# Patient Record
Sex: MALE | Race: ASIAN | URBAN - METROPOLITAN AREA
[De-identification: names, ages, dates, MRNs, and addresses within clinical notes are randomized per-mention and may not be internally consistent; named-entity substitution may affect disease eponyms.]

---

## 2018-11-06 ENCOUNTER — OFFICE VISIT (OUTPATIENT)
Dept: INTERNAL MEDICINE CLINIC | Age: 33
End: 2018-11-06
Payer: MEDICAID

## 2018-11-06 VITALS
HEIGHT: 63 IN | SYSTOLIC BLOOD PRESSURE: 132 MMHG | RESPIRATION RATE: 16 BRPM | DIASTOLIC BLOOD PRESSURE: 79 MMHG | HEART RATE: 90 BPM | WEIGHT: 148 LBS | BODY MASS INDEX: 26.22 KG/M2

## 2018-11-06 DIAGNOSIS — Z87.898 HISTORY OF PREDIABETES: ICD-10-CM

## 2018-11-06 DIAGNOSIS — H53.8 BLURRED VISION, BILATERAL: ICD-10-CM

## 2018-11-06 DIAGNOSIS — Z76.89 ESTABLISHING CARE WITH NEW DOCTOR, ENCOUNTER FOR: ICD-10-CM

## 2018-11-06 DIAGNOSIS — E53.8 VITAMIN B 12 DEFICIENCY: Primary | ICD-10-CM

## 2018-11-06 DIAGNOSIS — Z86.12 HISTORY OF POLIOMYELITIS: ICD-10-CM

## 2018-11-06 DIAGNOSIS — Z78.9 USES BRACE: ICD-10-CM

## 2018-11-06 DIAGNOSIS — Z13.220 SCREENING FOR HYPERLIPIDEMIA: ICD-10-CM

## 2018-11-06 PROCEDURE — G8419 CALC BMI OUT NRM PARAM NOF/U: HCPCS | Performed by: NURSE PRACTITIONER

## 2018-11-06 PROCEDURE — G8484 FLU IMMUNIZE NO ADMIN: HCPCS | Performed by: NURSE PRACTITIONER

## 2018-11-06 PROCEDURE — 99204 OFFICE O/P NEW MOD 45 MIN: CPT | Performed by: NURSE PRACTITIONER

## 2018-11-06 PROCEDURE — G8427 DOCREV CUR MEDS BY ELIG CLIN: HCPCS | Performed by: NURSE PRACTITIONER

## 2018-11-06 PROCEDURE — 1036F TOBACCO NON-USER: CPT | Performed by: NURSE PRACTITIONER

## 2018-11-06 ASSESSMENT — PATIENT HEALTH QUESTIONNAIRE - PHQ9
SUM OF ALL RESPONSES TO PHQ QUESTIONS 1-9: 0
SUM OF ALL RESPONSES TO PHQ9 QUESTIONS 1 & 2: 0
1. LITTLE INTEREST OR PLEASURE IN DOING THINGS: 0
SUM OF ALL RESPONSES TO PHQ QUESTIONS 1-9: 0
2. FEELING DOWN, DEPRESSED OR HOPELESS: 0

## 2018-11-08 DIAGNOSIS — Z13.220 SCREENING FOR HYPERLIPIDEMIA: ICD-10-CM

## 2018-11-08 DIAGNOSIS — E53.8 VITAMIN B 12 DEFICIENCY: ICD-10-CM

## 2018-11-08 DIAGNOSIS — Z87.898 HISTORY OF PREDIABETES: ICD-10-CM

## 2018-11-08 LAB
A/G RATIO: 2 (ref 1.1–2.2)
ALBUMIN SERPL-MCNC: 5.1 G/DL (ref 3.4–5)
ALP BLD-CCNC: 91 U/L (ref 40–129)
ALT SERPL-CCNC: 41 U/L (ref 10–40)
ANION GAP SERPL CALCULATED.3IONS-SCNC: 15 MMOL/L (ref 3–16)
AST SERPL-CCNC: 26 U/L (ref 15–37)
BILIRUB SERPL-MCNC: 1.2 MG/DL (ref 0–1)
BUN BLDV-MCNC: 8 MG/DL (ref 7–20)
CALCIUM SERPL-MCNC: 9.4 MG/DL (ref 8.3–10.6)
CHLORIDE BLD-SCNC: 101 MMOL/L (ref 99–110)
CHOLESTEROL, FASTING: 169 MG/DL (ref 0–199)
CO2: 23 MMOL/L (ref 21–32)
CREAT SERPL-MCNC: 0.7 MG/DL (ref 0.9–1.3)
FOLATE: 13.04 NG/ML (ref 4.78–24.2)
GFR AFRICAN AMERICAN: >60
GFR NON-AFRICAN AMERICAN: >60
GLOBULIN: 2.6 G/DL
GLUCOSE FASTING: 124 MG/DL (ref 70–99)
HDLC SERPL-MCNC: 36 MG/DL (ref 40–60)
LDL CHOLESTEROL CALCULATED: 82 MG/DL
POTASSIUM SERPL-SCNC: 4.1 MMOL/L (ref 3.5–5.1)
SODIUM BLD-SCNC: 139 MMOL/L (ref 136–145)
TOTAL PROTEIN: 7.7 G/DL (ref 6.4–8.2)
TRIGLYCERIDE, FASTING: 253 MG/DL (ref 0–150)
VITAMIN B-12: 245 PG/ML (ref 211–911)
VLDLC SERPL CALC-MCNC: 51 MG/DL

## 2018-11-09 LAB
ESTIMATED AVERAGE GLUCOSE: 134.1 MG/DL
HBA1C MFR BLD: 6.3 %

## 2018-11-13 ASSESSMENT — ENCOUNTER SYMPTOMS
NAUSEA: 0
COUGH: 0
WHEEZING: 0
SHORTNESS OF BREATH: 0
VOMITING: 0
CHEST TIGHTNESS: 0
SORE THROAT: 0
CONSTIPATION: 0
RHINORRHEA: 0
DIARRHEA: 0
COLOR CHANGE: 0
EYE DISCHARGE: 0
EYE REDNESS: 0

## 2019-05-15 ENCOUNTER — PATIENT MESSAGE (OUTPATIENT)
Dept: INTERNAL MEDICINE CLINIC | Age: 34
End: 2019-05-15

## 2019-05-17 NOTE — TELEPHONE ENCOUNTER
From: Shiva  To: MELANY Smith - CNP  Sent: 5/15/2019 6:58 PM EDT  Subject: Prescription Question    I have a question about VITAMIN B12 & FOLATE resulted on 11/8/18, 3:16 PM. Based on my lab work, how is my Vitamins B12 &FOLATE. Do you recommend me to take medicine or not?

## 2019-05-21 NOTE — TELEPHONE ENCOUNTER
Patient missed follow up appt that was scheduled in December, need to make appt as soon as possible to discuss lab results. Child  Still living? No  Family history of diabetes mellitus (DM), Age at diagnosis: Age Unknown

## 2019-05-22 ENCOUNTER — OFFICE VISIT (OUTPATIENT)
Dept: INTERNAL MEDICINE CLINIC | Age: 34
End: 2019-05-22
Payer: MEDICAID

## 2019-05-22 VITALS
OXYGEN SATURATION: 97 % | SYSTOLIC BLOOD PRESSURE: 149 MMHG | BODY MASS INDEX: 26.05 KG/M2 | WEIGHT: 147 LBS | DIASTOLIC BLOOD PRESSURE: 88 MMHG | HEART RATE: 76 BPM | TEMPERATURE: 98.6 F | RESPIRATION RATE: 17 BRPM | HEIGHT: 63 IN

## 2019-05-22 DIAGNOSIS — M21.951 DEFORMITY OF RIGHT LOWER EXTREMITY: ICD-10-CM

## 2019-05-22 DIAGNOSIS — H54.7 VISION PROBLEMS: ICD-10-CM

## 2019-05-22 DIAGNOSIS — E11.9 TYPE 2 DIABETES MELLITUS WITHOUT COMPLICATION, WITHOUT LONG-TERM CURRENT USE OF INSULIN (HCC): Primary | ICD-10-CM

## 2019-05-22 DIAGNOSIS — E78.1 HYPERTRIGLYCERIDEMIA: ICD-10-CM

## 2019-05-22 LAB — HBA1C MFR BLD: 6.7 %

## 2019-05-22 PROCEDURE — G8427 DOCREV CUR MEDS BY ELIG CLIN: HCPCS | Performed by: NURSE PRACTITIONER

## 2019-05-22 PROCEDURE — 83036 HEMOGLOBIN GLYCOSYLATED A1C: CPT | Performed by: NURSE PRACTITIONER

## 2019-05-22 PROCEDURE — G8419 CALC BMI OUT NRM PARAM NOF/U: HCPCS | Performed by: NURSE PRACTITIONER

## 2019-05-22 PROCEDURE — 3044F HG A1C LEVEL LT 7.0%: CPT | Performed by: NURSE PRACTITIONER

## 2019-05-22 PROCEDURE — 99214 OFFICE O/P EST MOD 30 MIN: CPT | Performed by: NURSE PRACTITIONER

## 2019-05-22 PROCEDURE — 2022F DILAT RTA XM EVC RTNOPTHY: CPT | Performed by: NURSE PRACTITIONER

## 2019-05-22 PROCEDURE — 1036F TOBACCO NON-USER: CPT | Performed by: NURSE PRACTITIONER

## 2019-05-22 RX ORDER — GEMFIBROZIL 600 MG/1
600 TABLET, FILM COATED ORAL
Qty: 60 TABLET | Refills: 2 | Status: SHIPPED | OUTPATIENT
Start: 2019-05-22 | End: 2019-07-03

## 2019-05-22 ASSESSMENT — ENCOUNTER SYMPTOMS
VOMITING: 0
COUGH: 0
SORE THROAT: 0
DIARRHEA: 0
NAUSEA: 0
BACK PAIN: 0
RHINORRHEA: 0

## 2019-05-22 ASSESSMENT — PATIENT HEALTH QUESTIONNAIRE - PHQ9
SUM OF ALL RESPONSES TO PHQ9 QUESTIONS 1 & 2: 0
SUM OF ALL RESPONSES TO PHQ QUESTIONS 1-9: 0
2. FEELING DOWN, DEPRESSED OR HOPELESS: 0
1. LITTLE INTEREST OR PLEASURE IN DOING THINGS: 0
SUM OF ALL RESPONSES TO PHQ QUESTIONS 1-9: 0

## 2019-05-22 NOTE — PATIENT INSTRUCTIONS
Patient Education        Prediabetes: Care Instructions  Your Care Instructions    Prediabetes is a warning sign that you are at risk for getting type 2 diabetes. It means that your blood sugar is higher than it should be. The food you eat turns into sugar, which your body uses for energy. Normally, an organ called the pancreas makes insulin, which allows the sugar in your blood to get into your body's cells. But when your body can't use insulin the right way, the sugar doesn't move into cells. It stays in your blood instead. This is called insulin resistance. The buildup of sugar in the blood causes prediabetes. The good news is that lifestyle changes may help you get your blood sugar back to normal and help you avoid or delay diabetes. Follow-up care is a key part of your treatment and safety. Be sure to make and go to all appointments, and call your doctor if you are having problems. It's also a good idea to know your test results and keep a list of the medicines you take. How can you care for yourself at home? · Watch your weight. A healthy weight helps your body use insulin properly. · Limit the amount of calories, sweets, and unhealthy fat you eat. Ask your doctor if you should see a dietitian. A registered dietitian can help you create meal plans that fit your lifestyle. · Get at least 30 minutes of exercise on most days of the week. Exercise helps control your blood sugar. It also helps you maintain a healthy weight. Walking is a good choice. You also may want to do other activities, such as running, swimming, cycling, or playing tennis or team sports. · Do not smoke. Smoking can make prediabetes worse. If you need help quitting, talk to your doctor about stop-smoking programs and medicines. These can increase your chances of quitting for good. · If your doctor prescribed medicines, take them exactly as prescribed. Call your doctor if you think you are having a problem with your medicine.  You will get more details on the specific medicines your doctor prescribes. When should you call for help? Watch closely for changes in your health, and be sure to contact your doctor if:    · You have any symptoms of diabetes. These may include:  ? Being thirsty more often. ? Urinating more. ? Being hungrier. ? Losing weight. ? Being very tired. ? Having blurry vision.     · You have a wound that will not heal.     · You have an infection that will not go away.     · You have problems with your blood pressure.     · You want more information about diabetes and how you can keep from getting it. Where can you learn more? Go to https://Heliaepepiceweb.Ask Ziggy. org and sign in to your StitcherAds account. Enter I222 in the Cloud Lending box to learn more about \"Prediabetes: Care Instructions. \"     If you do not have an account, please click on the \"Sign Up Now\" link. Current as of: July 25, 2018  Content Version: 12.0  © 0454-4618 Healthwise, [employer]. Care instructions adapted under license by [facility] ([facility]). If you have questions about a medical condition or this instruction, always ask your healthcare professional. [address] any warranty or liability for your use of this information. Patient Education        Learning About Diabetes Food Guidelines  Your Care Instructions    Meal planning is important to manage diabetes. It helps keep your blood sugar at a target level (which you set with your doctor). You don't have to eat special foods. You can eat what your family eats, including sweets once in a while. But you do have to pay attention to how often you eat and how much you eat of certain foods. You may want to work with a dietitian or a certified diabetes educator (CDE) to help you plan meals and snacks. A dietitian or CDE can also help you lose weight if that is one of your goals. What should you know about eating carbs?   Managing the amount of carbohydrate (carbs) you eat is an important part of healthy meals when you have diabetes. Carbohydrate is found in many foods. · Learn which foods have carbs. And learn the amounts of carbs in different foods. ? Bread, cereal, pasta, and rice have about 15 grams of carbs in a serving. A serving is 1 slice of bread (1 ounce), ½ cup of cooked cereal, or 1/3 cup of cooked pasta or rice. ? Fruits have 15 grams of carbs in a serving. A serving is 1 small fresh fruit, such as an apple or orange; ½ of a banana; ½ cup of cooked or canned fruit; ½ cup of fruit juice; 1 cup of melon or raspberries; or 2 tablespoons of dried fruit. ? Milk and no-sugar-added yogurt have 15 grams of carbs in a serving. A serving is 1 cup of milk or 2/3 cup of no-sugar-added yogurt. ? Starchy vegetables have 15 grams of carbs in a serving. A serving is ½ cup of mashed potatoes or sweet potato; 1 cup winter squash; ½ of a small baked potato; ½ cup of cooked beans; or ½ cup cooked corn or green peas. · Learn how much carbs to eat each day and at each meal. A dietitian or CDE can teach you how to keep track of the amount of carbs you eat. This is called carbohydrate counting. · If you are not sure how to count carbohydrate grams, use the Plate Method to plan meals. It is a good, quick way to make sure that you have a balanced meal. It also helps you spread carbs throughout the day. ? Divide your plate by types of foods. Put non-starchy vegetables on half the plate, meat or other protein food on one-quarter of the plate, and a grain or starchy vegetable in the final quarter of the plate. To this you can add a small piece of fruit and 1 cup of milk or yogurt, depending on how many carbs you are supposed to eat at a meal.  · Try to eat about the same amount of carbs at each meal. Do not \"save up\" your daily allowance of carbs to eat at one meal.  · Proteins have very little or no carbs per serving.  Examples of proteins are beef, chicken, turkey, fish, eggs, tofu, cheese, cottage cheese, and peanut butter. A serving size of meat is 3 ounces, which is about the size of a deck of cards. Examples of meat substitute serving sizes (equal to 1 ounce of meat) are 1/4 cup of cottage cheese, 1 egg, 1 tablespoon of peanut butter, and ½ cup of tofu. How can you eat out and still eat healthy? · Learn to estimate the serving sizes of foods that have carbohydrate. If you measure food at home, it will be easier to estimate the amount in a serving of restaurant food. · If the meal you order has too much carbohydrate (such as potatoes, corn, or baked beans), ask to have a low-carbohydrate food instead. Ask for a salad or green vegetables. · If you use insulin, check your blood sugar before and after eating out to help you plan how much to eat in the future. · If you eat more carbohydrate at a meal than you had planned, take a walk or do other exercise. This will help lower your blood sugar. What else should you know? · Limit saturated fat, such as the fat from meat and dairy products. This is a healthy choice because people who have diabetes are at higher risk of heart disease. So choose lean cuts of meat and nonfat or low-fat dairy products. Use olive or canola oil instead of butter or shortening when cooking. · Don't skip meals. Your blood sugar may drop too low if you skip meals and take insulin or certain medicines for diabetes. · Check with your doctor before you drink alcohol. Alcohol can cause your blood sugar to drop too low. Alcohol can also cause a bad reaction if you take certain diabetes medicines. Follow-up care is a key part of your treatment and safety. Be sure to make and go to all appointments, and call your doctor if you are having problems. It's also a good idea to know your test results and keep a list of the medicines you take. Where can you learn more? Go to https://kentrell.healthEncore.fm. org and sign in to your Codekko account.  Enter P630 in the Search Health Information box to learn more about \"Learning About Diabetes Food Guidelines. \"     If you do not have an account, please click on the \"Sign Up Now\" link. Current as of: July 25, 2018  Content Version: 12.0  © 6960-7415 [employer] Care instructions adapted under license by [facility] ([facility]). If you have questions about a medical condition or this instruction, always ask your healthcare professional. [address] any warranty or liability for your use of this information. Patient Education        Learning About High Cholesterol  What is high cholesterol? Cholesterol is a type of fat in your blood. It is needed for many body functions, such as making new cells. Cholesterol is made by your body. It also comes from food you eat. If you have too much cholesterol, it starts to build up in your arteries. This is called hardening of the arteries, or atherosclerosis. High cholesterol raises your risk of a heart attack and stroke. There are different types of cholesterol. LDL is the \"bad\" cholesterol. High LDL can raise your risk for heart disease, heart attack, and stroke. HDL is the \"good\" cholesterol. High HDL is linked with a lower risk for heart disease, heart attack, and stroke. Your cholesterol levels help your doctor find out your risk for having a heart attack or stroke. How can you prevent high cholesterol? A heart-healthy lifestyle can help you prevent high cholesterol. This lifestyle helps lower your risk for a heart attack and stroke. · Eat heart-healthy foods. ? Eat fruits, vegetables, whole grains (like oatmeal), dried beans and peas, nuts and seeds, soy products (like tofu), and fat-free or low-fat dairy products. ? Replace butter, margarine, and hydrogenated or partially hydrogenated oils with olive and canola oils. (Canola oil margarine without trans fat is fine.)  ? Replace red meat with fish, poultry, and soy protein (like tofu). ?  Limit processed and packaged foods like chips, crackers, and cookies. · Be active. Exercise can improve your cholesterol level. Get at least 30 minutes of exercise on most days of the week. Walking is a good choice. You also may want to do other activities, such as running, swimming, cycling, or playing tennis or team sports. · Stay at a healthy weight. Lose weight if you need to. · Don't smoke. If you need help quitting, talk to your doctor about stop-smoking programs and medicines. These can increase your chances of quitting for good. How is high cholesterol treated? The goal of treatment is to reduce your chances of having a heart attack or stroke. The goal is not to lower your cholesterol numbers only. · You may make lifestyle changes, such as eating healthy foods, not smoking, losing weight, and being more active. · You may have to take medicine. Follow-up care is a key part of your treatment and safety. Be sure to make and go to all appointments, and call your doctor if you are having problems. It's also a good idea to know your test results and keep a list of the medicines you take. Where can you learn more? Go to https://XimoXipepiceweb.Attune. org and sign in to your Gotta'go Personal Care Device account. Enter I232 in the formerly [facility] box to learn more about \"Learning About High Cholesterol. \"     If you do not have an account, please click on the \"Sign Up Now\" link. Current as of: July 22, 2018  Content Version: 12.0  © 3220-1843 Healthwise, Inc[employer]. Care instructions adapted under license by [facility] ([facility]). If you have questions about a medical condition or this instruction, always ask your healthcare professional. [place] [address] any warranty or liability for your use of this information.

## 2019-05-22 NOTE — PROGRESS NOTES
[facility]  [address] [place] [address] Drive  [address] [address] [address]  Dept: [phone]       2019     Davey Philippe (:  1985) is a 36 y.o. male, here for evaluation of the following medical concerns:    Patient here for follow up for prediabetes and hypertriglyceridemia. Patient also reports he also would like referral to get a new brace for his right leg, patient had polio as a child, has not seen an orthopedic provider in a few years. Review of Systems   Constitutional: Negative for activity change and appetite change. HENT: Negative for congestion, rhinorrhea, sneezing and sore throat. Respiratory: Negative for cough. Cardiovascular: Negative for chest pain. Gastrointestinal: Negative for diarrhea, nausea and vomiting. Genitourinary: Negative for dysuria. Musculoskeletal: Negative for back pain. Neurological: Negative for headaches. Prior to Visit Medications    Medication Sig Taking? Authorizing Provider   gemfibrozil (LOPID) 600 MG tablet Take 1 tablet by mouth 2 times daily (before meals) Yes Ila Ayala, APRN - CNP        Social History     Tobacco Use    Smoking status: Never Smoker    Smokeless tobacco: Never Used   Substance Use Topics    Alcohol use: No        Vitals:    19 1623   BP: (!) 149/88   Site: Left Upper Arm   Position: Sitting   Cuff Size: Small Adult   Pulse: 76   Resp: 17   Temp: 98.6 °F (37 °C)   TempSrc: Oral   SpO2: 97%   Weight: 147 lb (66.7 kg)   Height: 5' 3\" (1.6 m)     Estimated body mass index is 26.04 kg/m² as calculated from the following:    Height as of this encounter: 5' 3\" (1.6 m). Weight as of this encounter: 147 lb (66.7 kg). Physical Exam   Constitutional: He is oriented to person, place, and time. He appears well-developed. HENT:   Head: Normocephalic.    Right Ear: Tympanic membrane and ear canal normal.   Left Ear: Tympanic membrane and ear canal normal.   Mouth/Throat: Uvula is midline and oropharynx is clear and moist.   Eyes: Conjunctivae, EOM and lids are normal.   Neck: Normal range of motion. Neck supple. Cardiovascular: Regular rhythm and normal heart sounds. Pulmonary/Chest: Effort normal and breath sounds normal.   Abdominal: Soft. Normal appearance and bowel sounds are normal. There is no hepatosplenomegaly. Musculoskeletal:        Right hip: He exhibits decreased range of motion and decreased strength. Left hip: He exhibits decreased range of motion. FROM X4 EXT   Lymphadenopathy:     He has no cervical adenopathy. Neurological: He is alert and oriented to person, place, and time. Skin: Skin is warm and dry. Psychiatric: He has a normal mood and affect. ASSESSMENT/PLAN:    1. Type 2 diabetes mellitus without complication, without long-term current use of insulin ([address]  New diagnosis; patient refuses to to take medication at this time and wants to manage with diet and lifestyle changes  - POCT glycosylated hemoglobin (Hb A1C)  - [facility]) Medication Mgmt (Diabetes - Clinical Pharmacy) - [place]    2. Hypertriglyceridemia  Uncontrolled;  - gemfibrozil (LOPID) 600 MG tablet; Take 1 tablet by mouth 2 times daily (before meals)  Dispense: 60 tablet; Refill: 2    3. Deformity of right lower extremity    - Candace - Agusto Engle MD, Orthopedic Surgery, [facility]    4. Vision problems  New complaint;  - AFL - Ángeal Blake MD, Ophthalmology, [facility]      Return in about 3 months (around 8/21/2019) for f/u with Leandra Trent CNP extended visit, diabetes, cholesterol.

## 2019-05-29 ENCOUNTER — TELEPHONE (OUTPATIENT)
Dept: PHARMACY | Age: 34
End: 2019-05-29

## 2019-07-03 ENCOUNTER — OFFICE VISIT (OUTPATIENT)
Dept: PHARMACY | Age: 34
End: 2019-07-03
Payer: MEDICAID

## 2019-07-03 VITALS
DIASTOLIC BLOOD PRESSURE: 67 MMHG | BODY MASS INDEX: 25.72 KG/M2 | WEIGHT: 145.2 LBS | HEART RATE: 87 BPM | SYSTOLIC BLOOD PRESSURE: 160 MMHG

## 2019-07-03 DIAGNOSIS — E11.9 TYPE 2 DIABETES MELLITUS WITHOUT COMPLICATION, WITHOUT LONG-TERM CURRENT USE OF INSULIN (HCC): Primary | ICD-10-CM

## 2019-07-03 PROCEDURE — 99214 OFFICE O/P EST MOD 30 MIN: CPT

## 2019-07-03 RX ORDER — GEMFIBROZIL 600 MG/1
600 TABLET, FILM COATED ORAL
COMMUNITY

## 2019-07-03 NOTE — PATIENT INSTRUCTIONS
1. Think about keeping a log of your carbohydrates and decreasing these if needed  2. Making healthy food and beverage choices  3.  I will check on a physical activity program.   4. Call if you are interested in a dietitian visit or any follow up

## 2019-07-05 NOTE — PROGRESS NOTES
possibility of medication. PMHx:    Past Medical History:   Diagnosis Date    Prediabetes     Seizures ([address]     as a 2year old       HPI:   1. Type 2 diabetes mellitus without complication, without long-term current use of insulin ([facility])        Social History:    Social History     Tobacco Use    Smoking status: Never Smoker    Smokeless tobacco: Never Used   Substance Use Topics    Alcohol use: No     If current smoker, advised to quit: N/A    Pertinent Labs:    Lab Results   Component Value Date    LABA1C 6.7 05/22/2019    LABA1C 6.3 11/08/2018     We are providing patient education only and A1c not at goal, this was addressed with appropriate recommendations and f/u in 1-4 weeks or as appropriate:  No f/u at this time. He will call for f/u when he is able. Difficult schedule w full time work and school. Lab Results   Component Value Date    CREATININE 0.7 (L) 11/08/2018    BUN 8 11/08/2018     11/08/2018    K 4.1 11/08/2018     11/08/2018    CO2 23 11/08/2018     If creatinine elevated, medications affected were addressed: WNL    No results found for: [facility]  If not completed in the past year and not on an ACE Inhibitor, recommended lab: yes    Lab Results   Component Value Date    HDL 36 (L) 11/08/2018    LDLCALC 82 11/08/2018     Lab Results   Component Value Date    ALT 41 11/08/2018     Reviewed LDL and recommended therapy if appropriate: Yes. Mr. Genny Harden is on a Statin No: on Lopid, we talked about controlling w diet and appropriate levels. TG elevated     Weight:  Wt Readings from Last 3 Encounters:   07/03/19 145 lb 3.2 oz (65.9 kg)   05/22/19 147 lb (66.7 kg)   11/06/18 148 lb (67.1 kg)     If BMI elevated, weight loss recommended: yes, could benefit from a small weight loss. Immunizations: There is no immunization history on file for this patient.   If Influenza not current, recommended or given: yes  If Pneumococcal vaccinations not current, recommended or given: Eating plan Information  * Smoking Cessation Product Information    Physician Follow-up for Diabetes: yes       Plan     Action taken today including medication changes:  education    Recommendations to the physician:  [facility] [address] Follow-up   Diabetes Service   As needed     Electronically signed by Elzbieta Campos, PharmD on 7/5/2019 at 5:38 PM

## 2019-07-16 ENCOUNTER — TELEPHONE (OUTPATIENT)
Dept: INTERNAL MEDICINE CLINIC | Age: 34
End: 2019-07-16

## 2019-07-16 DIAGNOSIS — M21.951 DEFORMITY OF RIGHT LOWER EXTREMITY: Primary | ICD-10-CM

## 2019-07-16 NOTE — TELEPHONE ENCOUNTER
Tried to call the patient to explain that the brace would be ordered by Orthopedic surgery and not our office. The patient was referred to that office on 5/22/19 and never made an appointment.  Patient needs to call   [address] [place] [address] [address], [address], [address], [address]  Phone: [phone]

## 2019-08-01 ENCOUNTER — OFFICE VISIT (OUTPATIENT)
Dept: ORTHOPEDIC SURGERY | Age: 34
End: 2019-08-01
Payer: MEDICAID

## 2019-08-01 VITALS — WEIGHT: 145 LBS | HEIGHT: 63 IN | BODY MASS INDEX: 25.69 KG/M2 | RESPIRATION RATE: 16 BRPM

## 2019-08-01 DIAGNOSIS — M25.251 FLAIL JOINT, RIGHT HIP: Primary | ICD-10-CM

## 2019-08-01 DIAGNOSIS — M25.271 FLAIL JOINT, RIGHT ANKLE AND FOOT: ICD-10-CM

## 2019-08-01 DIAGNOSIS — M25.261 FLAIL JOINT, RIGHT KNEE: ICD-10-CM

## 2019-08-01 PROCEDURE — 99243 OFF/OP CNSLTJ NEW/EST LOW 30: CPT | Performed by: ORTHOPAEDIC SURGERY

## 2019-08-01 PROCEDURE — G8419 CALC BMI OUT NRM PARAM NOF/U: HCPCS | Performed by: ORTHOPAEDIC SURGERY

## 2019-08-01 PROCEDURE — G8427 DOCREV CUR MEDS BY ELIG CLIN: HCPCS | Performed by: ORTHOPAEDIC SURGERY

## 2019-08-01 NOTE — PROGRESS NOTES
Everton Jaeger  [id number]  August 1, 2019    Chief Complaint   Patient presents with    Leg Pain     rt leg        History: The patient is a 45-year-old gentleman who is here for evaluation of his right leg. The patient is from [place] and has an extensive history with regards to his right lower extremity. The patient reportedly describes having some neurologic issues and seizures at the age of 4. He then had some contractures involving his right lower extremity and ultimately required several surgeries. He had a surgery back in 2001 to straighten his leg. He then had a second surgery on his hip. He has been wearing an old knee ankle-foot orthosis for several years. He currently is going to college here in the [facility]. He is a Romansh . He denies any recent injury. This is a consult from MELANY Flores CNP  For right lower extremity weakness. The patient's  past medical history, medications, allergies,  family history, social history, and have been reviewed, and dated and are recorded in the chart. Pertinent items are noted in HPI. Review of systems reviewed from Pertinent History Form dated on 8/1/19 and available in the patient's chart under the Media tab. Vitals:  Resp 16   Ht 5' 3\" (1.6 m)   Wt 145 lb (65.8 kg)   BMI 25.69 kg/m²     Physical: On examination today, the patient is alert and oriented x3. Examination of the right lower extremity reveals diffuse atrophy. He has a well-healed anterolateral scar over his right hip. He essentially has a flail right lower extremity. He is unable to extend the right knee. We passively extended his knee without difficulty. He has 3 out of 5 strength for right knee flexion. He has 3+ out of 5 strength for right ankle dorsiflexion and plantarflexion. He has no pain with range of motion of his hips. Sensation does appear grossly intact in the right lower extremity. There is no evidence of DVT.   Examination of his left lower extremity

## 2019-09-11 ENCOUNTER — TELEPHONE (OUTPATIENT)
Dept: INTERNAL MEDICINE CLINIC | Age: 34
End: 2019-09-11

## 2019-09-16 ENCOUNTER — OFFICE VISIT (OUTPATIENT)
Dept: INTERNAL MEDICINE CLINIC | Age: 34
End: 2019-09-16
Payer: MEDICAID

## 2019-09-16 VITALS
HEART RATE: 79 BPM | OXYGEN SATURATION: 95 % | BODY MASS INDEX: 25.51 KG/M2 | SYSTOLIC BLOOD PRESSURE: 136 MMHG | DIASTOLIC BLOOD PRESSURE: 89 MMHG | WEIGHT: 144 LBS

## 2019-09-16 DIAGNOSIS — Z23 NEED FOR IMMUNIZATION AGAINST INFLUENZA: ICD-10-CM

## 2019-09-16 DIAGNOSIS — E78.1 HYPERTRIGLYCERIDEMIA: ICD-10-CM

## 2019-09-16 DIAGNOSIS — R73.03 PREDIABETES: Primary | ICD-10-CM

## 2019-09-16 DIAGNOSIS — Z86.12 HX OF POST-POLIO SYNDROME: ICD-10-CM

## 2019-09-16 LAB — HBA1C MFR BLD: 6 %

## 2019-09-16 PROCEDURE — 90686 IIV4 VACC NO PRSV 0.5 ML IM: CPT | Performed by: NURSE PRACTITIONER

## 2019-09-16 PROCEDURE — 99214 OFFICE O/P EST MOD 30 MIN: CPT | Performed by: NURSE PRACTITIONER

## 2019-09-16 PROCEDURE — G8419 CALC BMI OUT NRM PARAM NOF/U: HCPCS | Performed by: NURSE PRACTITIONER

## 2019-09-16 PROCEDURE — 90471 IMMUNIZATION ADMIN: CPT | Performed by: NURSE PRACTITIONER

## 2019-09-16 PROCEDURE — 83036 HEMOGLOBIN GLYCOSYLATED A1C: CPT | Performed by: NURSE PRACTITIONER

## 2019-09-16 PROCEDURE — G8427 DOCREV CUR MEDS BY ELIG CLIN: HCPCS | Performed by: NURSE PRACTITIONER

## 2019-09-16 PROCEDURE — 1036F TOBACCO NON-USER: CPT | Performed by: NURSE PRACTITIONER

## 2019-09-16 NOTE — PROGRESS NOTES
Left Upper Arm   Position: Sitting Sitting   Cuff Size: Large Adult Large Adult   Pulse: 69 79   SpO2: 95%    Weight: 144 lb (65.3 kg)      Estimated body mass index is 25.51 kg/m² as calculated from the following:    Height as of 8/1/19: 5' 3\" (1.6 m). Weight as of this encounter: 144 lb (65.3 kg). Physical Exam   Constitutional: He is oriented to person, place, and time. He appears well-developed. HENT:   Head: Normocephalic. Right Ear: Tympanic membrane and ear canal normal.   Left Ear: Tympanic membrane and ear canal normal.   Mouth/Throat: Uvula is midline and oropharynx is clear and moist.   Eyes: Lids are normal.   Cardiovascular: Regular rhythm and normal heart sounds. Pulmonary/Chest: Effort normal and breath sounds normal.   Abdominal: Soft. Normal appearance and bowel sounds are normal. There is no hepatosplenomegaly. Musculoskeletal:        Right upper leg: He exhibits deformity. Lymphadenopathy:     He has no cervical adenopathy. Neurological: He is alert and oriented to person, place, and time. ASSESSMENT/PLAN:    1. Prediabetes  Controlled; will continue to monitor  - POCT glycosylated hemoglobin (Hb A1C)  - Comprehensive Metabolic Panel, Fasting; Future    2. Hypertriglyceridemia  Uncontrolled; continue meds as prescribed  - Lipid, Fasting; Future    3. Hx of post-polio syndrome      4. Need for immunization against influenza    - INFLUENZA, QUADV, 3 YRS AND OLDER, IM PF, PREFILL SYR OR SDV, 0.5ML (AFLURIA QUADV, PF)      Return in about 6 months (around 3/16/2020) for follow up with Mars Hoskins CNP, prediabetes, cholesterol.

## 2019-09-17 ASSESSMENT — ENCOUNTER SYMPTOMS
DIARRHEA: 0
CONSTIPATION: 0
COUGH: 0
SORE THROAT: 0

## 2019-09-19 ENCOUNTER — TELEPHONE (OUTPATIENT)
Dept: INTERNAL MEDICINE CLINIC | Age: 34
End: 2019-09-19

## 2020-10-23 ENCOUNTER — TELEPHONE (OUTPATIENT)
Dept: ORTHOPEDIC SURGERY | Age: 35
End: 2020-10-23

## 2022-12-10 SDOH — HEALTH STABILITY: PHYSICAL HEALTH: ON AVERAGE, HOW MANY DAYS PER WEEK DO YOU ENGAGE IN MODERATE TO STRENUOUS EXERCISE (LIKE A BRISK WALK)?: 2 DAYS

## 2022-12-10 SDOH — HEALTH STABILITY: PHYSICAL HEALTH: ON AVERAGE, HOW MANY MINUTES DO YOU ENGAGE IN EXERCISE AT THIS LEVEL?: 10 MIN

## 2022-12-12 NOTE — PATIENT INSTRUCTIONS
Patient Education        Well Visit, Ages 25 to 72: Care Instructions  Well visits can help you stay healthy. Your doctor has checked your overall health and may have suggested ways to take good care of yourself. Your doctor also may have recommended tests. You can help prevent illness with healthy eating, good sleep, vaccinations, regular exercise, and other steps. Get the tests that you and your doctor decide on. Depending on your age and risks, examples might include screening for diabetes; hepatitis C; HIV; and cervical, breast, lung, and colon cancer. Screening helps find diseases before any symptoms appear. Eat healthy foods. Choose fruits, vegetables, whole grains, lean protein, and low-fat dairy foods. Limit saturated fat and reduce salt. Limit alcohol. Men should have no more than 2 drinks a day. Women should have no more than 1. For some people, no alcohol is the best choice. Exercise. Get at least 30 minutes of exercise on most days of the week. Walking can be a good choice. Reach and stay at your healthy weight. This will lower your risk for many health problems. Take care of your mental health. Try to stay connected with friends, family, and community, and find ways to manage stress. If you're feeling depressed or hopeless, talk to someone. A counselor can help. If you don't have a counselor, talk to your doctor. Talk to your doctor if you think you may have a problem with alcohol or drug use. This includes prescription medicines and illegal drugs. Avoid tobacco and nicotine: Don't smoke, vape, or chew. If you need help quitting, talk to your doctor. Practice safer sex. Getting tested, using condoms or dental dams, and limiting sex partners can help prevent STIs. Use birth control if it's important to you to prevent pregnancy. Talk with your doctor about your choices and what might be best for you. Prevent problems where you can.  Protect your skin from too much sun, wash your hands, brush your teeth twice a day, and wear a seat belt in the car. Where can you learn more? Go to http://www.lovett.com/ and enter P072 to learn more about \"Well Visit, Ages 25 to 72: Care Instructions. \"  Current as of: March 9, 2022               Content Version: 13.5  © 1596-4087 [employer], [employer]. Care instructions adapted under license by [facility] ([facility]). If you have questions about a medical condition or this instruction, always ask your healthcare professional. [address] any warranty or liability for your use of this information.

## 2022-12-12 NOTE — PROGRESS NOTES
2022    Doug [place] (:  1985) is a 40 y.o. male, here for evaluation of the following medical concerns:    HPI    Well Adult Physical: Patient here for a comprehensive physical exam.The patient reports problems - snoring, poor sleep  Do you take any herbs or supplements that were not prescribed by a doctor? no Are you taking calcium supplements? not applicable Are you taking aspirin daily? not applicable    Sexual activity: has sex with females   Diet: Healthy  Exercise: no regular exercise  Seatbelt use: yes    Review of Systems   Constitutional:  Negative for activity change, fatigue and unexpected weight change. HENT:  Negative for hearing loss. Eyes:  Negative for visual disturbance. Respiratory:  Negative for cough and shortness of breath. Cardiovascular:  Negative for chest pain and leg swelling. Gastrointestinal:  Negative for blood in stool. Endocrine: Negative for polydipsia and polyphagia. Genitourinary:  Negative for dysuria, frequency, penile discharge, penile pain, scrotal swelling and testicular pain. Musculoskeletal:  Negative for arthralgias. Skin:  Negative for rash. Allergic/Immunologic: Negative for environmental allergies. Neurological:  Negative for dizziness and headaches. Hematological:  Does not bruise/bleed easily. Psychiatric/Behavioral:  Negative for dysphoric mood and sleep disturbance. The patient is not nervous/anxious. Prior to Visit Medications    Medication Sig Taking? Authorizing Provider   Handicap Placard MISC by Does not apply route Expiration 5 Years.  Yes Maroln Dawn, DO        No Known Allergies    Past Medical History:   Diagnosis Date    Hx of post-polio syndrome     Prediabetes     Seizures ([facility] [address]     as a 2year old    Sleep apnea Not sure    I snores at night       Past Surgical History:   Procedure Laterality Date    APPENDECTOMY  2010    LEG SURGERY Right 1998       Social History     Socioeconomic History    Marital status:      Spouse name: Not on file    Number of children: Not on file    Years of education: Not on file    Highest education level: Not on file   Occupational History    Not on file   Tobacco Use    Smoking status: Never    Smokeless tobacco: Never   Vaping Use    Vaping Use: Never used   Substance and Sexual Activity    Alcohol use: No    Drug use: No    Sexual activity: Yes     Partners: Female   Other Topics Concern    Not on file   Social History Narrative    Not on file     Social Determinants of Health     Financial Resource Strain: Low Risk     Difficulty of Paying Living Expenses: Not hard at all   Food Insecurity: No Food Insecurity    Worried About Running Out of Food in the Last Year: Never true    [address] [facility] [address] of Food in the Last Year: Never true   Transportation Needs: Not on file   Physical Activity: Insufficiently Active    Days of Exercise per Week: 2 days    Minutes of Exercise per Session: 10 min   Stress: Not on file   Social Connections: Not on file   Intimate Partner Violence: Not At Risk    Fear of Current or Ex-Partner: No    Emotionally Abused: No    Physically Abused: No    Sexually Abused: No   Housing Stability: Not on file        Family History   Problem Relation Age of Onset    Diabetes Mother         She is dibetic    High Blood Pressure Father     Seizures Father     Kidney Disease Father     Diabetes Paternal Grandmother     Asthma Paternal Grandfather        Vitals:    12/13/22 0900 12/13/22 0909   BP: (!) 155/112 136/88   Pulse: 72    Temp: 98 °F (36.7 °C)    TempSrc: Temporal    Weight: 152 lb (68.9 kg)    Height: 5' 3\" (1.6 m)      Estimated body mass index is 26.93 kg/m² as calculated from the following:    Height as of this encounter: 5' 3\" (1.6 m). Weight as of this encounter: 152 lb (68.9 kg). Physical Exam  Vitals reviewed. Constitutional:       Appearance: Normal appearance. He is normal weight. HENT:      Head: Normocephalic and atraumatic.       Right Ear: Tympanic membrane, ear canal and external ear normal.      Left Ear: Tympanic membrane, ear canal and external ear normal.      Nose: Nose normal.      Mouth/Throat:      Mouth: Mucous membranes are moist.      Pharynx: Oropharynx is clear. Comments: Mallampati 4  Eyes:      Extraocular Movements: Extraocular movements intact. Conjunctiva/sclera: Conjunctivae normal.   Cardiovascular:      Rate and Rhythm: Normal rate and regular rhythm. Pulses: Normal pulses. Heart sounds: Normal heart sounds. Pulmonary:      Effort: Pulmonary effort is normal.      Breath sounds: Normal breath sounds. Abdominal:      General: Abdomen is flat. Bowel sounds are normal.      Palpations: Abdomen is soft. Musculoskeletal:         General: Normal range of motion. Cervical back: Normal range of motion and neck supple. Skin:     General: Skin is warm and dry. Capillary Refill: Capillary refill takes less than 2 seconds. Findings: No rash. Neurological:      General: No focal deficit present. Mental Status: He is alert and oriented to person, place, and time. Mental status is at baseline. Psychiatric:         Mood and Affect: Mood normal.         Behavior: Behavior normal.         Thought Content: Thought content normal.         Judgment: Judgment normal.       Separate Identifiable issues addressed today:  Sleep Apnea  Patient presents with possible obstructive sleep apnea. Patent has a several year history of symptoms of daytime fatigue. Patient generally gets 6 or 7 hours of sleep per night, and states they generally have nightime awakenings. Snoring of moderate severity is present. Apneic episodes is present. Nasal obstruction is not present. Patient has not had tonsillectomy. ASSESSMENT/PLAN:  Tony Marcus was seen today for [facility] care.     Diagnoses and all orders for this visit:    Routine general medical examination at a health care facility: Vitals reviewed and within normal limits. BMI nonobese. Depression screen negative. Reviewed lifestyle with patient and recommended appropriate modifications. Witnessed apneic spells: Snoring and witnessed episodes of apnea. Referring for sleep study. -     Linda Ayala MD, Sleep Medicine, [facility]    Paralysis of right lower extremity [facility]): Due to polio in his childhood. Has a brace, which was provided by Dr. Lorie Severino in the past.  Needs new brace. -     Karol Neff MD, Orthopedic Surgery (Hip; Knee; Shoulder; Elbow), [facility]    Screening for diabetes mellitus  -     Hemoglobin A1C; Future    Encounter for hepatitis C screening test for low risk patient  -     Hepatitis C Antibody; Future    Screening for HIV (human immunodeficiency virus)  -     HIV Screen; Future    Return in about 6 weeks (around 1/24/2023) for after sleep evaluation. An  electronic signature was used to authenticate this note.     --Juan Desai,  on 12/13/2022 at 9:36 AM

## 2022-12-13 ENCOUNTER — OFFICE VISIT (OUTPATIENT)
Dept: PRIMARY CARE CLINIC | Age: 37
End: 2022-12-13
Payer: MEDICAID

## 2022-12-13 VITALS
TEMPERATURE: 98 F | WEIGHT: 152 LBS | BODY MASS INDEX: 26.93 KG/M2 | HEIGHT: 63 IN | DIASTOLIC BLOOD PRESSURE: 88 MMHG | SYSTOLIC BLOOD PRESSURE: 136 MMHG | HEART RATE: 72 BPM

## 2022-12-13 DIAGNOSIS — G83.11 PARALYSIS OF RIGHT LOWER EXTREMITY (HCC): ICD-10-CM

## 2022-12-13 DIAGNOSIS — Z00.00 ROUTINE GENERAL MEDICAL EXAMINATION AT A HEALTH CARE FACILITY: ICD-10-CM

## 2022-12-13 DIAGNOSIS — Z13.1 SCREENING FOR DIABETES MELLITUS: ICD-10-CM

## 2022-12-13 DIAGNOSIS — Z00.00 ROUTINE GENERAL MEDICAL EXAMINATION AT A HEALTH CARE FACILITY: Primary | ICD-10-CM

## 2022-12-13 DIAGNOSIS — Z11.59 ENCOUNTER FOR HEPATITIS C SCREENING TEST FOR LOW RISK PATIENT: ICD-10-CM

## 2022-12-13 DIAGNOSIS — Z11.4 SCREENING FOR HIV (HUMAN IMMUNODEFICIENCY VIRUS): ICD-10-CM

## 2022-12-13 DIAGNOSIS — R06.81 WITNESSED APNEIC SPELLS: ICD-10-CM

## 2022-12-13 LAB
HEPATITIS C ANTIBODY INTERPRETATION: NORMAL
HIV AG/AB: NORMAL
HIV ANTIGEN: NORMAL
HIV-1 ANTIBODY: NORMAL
HIV-2 AB: NORMAL
VITAMIN B-12: 209 PG/ML (ref 211–911)
VITAMIN D 25-HYDROXY: 9.5 NG/ML

## 2022-12-13 PROCEDURE — G8482 FLU IMMUNIZE ORDER/ADMIN: HCPCS | Performed by: STUDENT IN AN ORGANIZED HEALTH CARE EDUCATION/TRAINING PROGRAM

## 2022-12-13 PROCEDURE — 99385 PREV VISIT NEW AGE 18-39: CPT | Performed by: STUDENT IN AN ORGANIZED HEALTH CARE EDUCATION/TRAINING PROGRAM

## 2022-12-13 RX ORDER — UBIDECARENONE 75 MG
100 CAPSULE ORAL DAILY
Qty: 30 TABLET | Refills: 1 | Status: SHIPPED | OUTPATIENT
Start: 2022-12-13 | End: 2023-12-13

## 2022-12-13 RX ORDER — ERGOCALCIFEROL 1.25 MG/1
50000 CAPSULE ORAL WEEKLY
Qty: 12 CAPSULE | Refills: 1 | Status: SHIPPED | OUTPATIENT
Start: 2022-12-13

## 2022-12-13 SDOH — ECONOMIC STABILITY: FOOD INSECURITY: WITHIN THE PAST 12 MONTHS, THE FOOD YOU BOUGHT JUST DIDN'T LAST AND YOU DIDN'T HAVE MONEY TO GET MORE.: NEVER TRUE

## 2022-12-13 SDOH — ECONOMIC STABILITY: FOOD INSECURITY: WITHIN THE PAST 12 MONTHS, YOU WORRIED THAT YOUR FOOD WOULD RUN OUT BEFORE YOU GOT MONEY TO BUY MORE.: NEVER TRUE

## 2022-12-13 ASSESSMENT — PATIENT HEALTH QUESTIONNAIRE - PHQ9
9. THOUGHTS THAT YOU WOULD BE BETTER OFF DEAD, OR OF HURTING YOURSELF: 0
7. TROUBLE CONCENTRATING ON THINGS, SUCH AS READING THE NEWSPAPER OR WATCHING TELEVISION: 0
2. FEELING DOWN, DEPRESSED OR HOPELESS: 0
SUM OF ALL RESPONSES TO PHQ QUESTIONS 1-9: 4
1. LITTLE INTEREST OR PLEASURE IN DOING THINGS: 0
4. FEELING TIRED OR HAVING LITTLE ENERGY: 2
SUM OF ALL RESPONSES TO PHQ9 QUESTIONS 1 & 2: 0
SUM OF ALL RESPONSES TO PHQ QUESTIONS 1-9: 4
3. TROUBLE FALLING OR STAYING ASLEEP: 2
SUM OF ALL RESPONSES TO PHQ QUESTIONS 1-9: 4
5. POOR APPETITE OR OVEREATING: 0
8. MOVING OR SPEAKING SO SLOWLY THAT OTHER PEOPLE COULD HAVE NOTICED. OR THE OPPOSITE, BEING SO FIGETY OR RESTLESS THAT YOU HAVE BEEN MOVING AROUND A LOT MORE THAN USUAL: 0
6. FEELING BAD ABOUT YOURSELF - OR THAT YOU ARE A FAILURE OR HAVE LET YOURSELF OR YOUR FAMILY DOWN: 0
SUM OF ALL RESPONSES TO PHQ QUESTIONS 1-9: 4
10. IF YOU CHECKED OFF ANY PROBLEMS, HOW DIFFICULT HAVE THESE PROBLEMS MADE IT FOR YOU TO DO YOUR WORK, TAKE CARE OF THINGS AT HOME, OR GET ALONG WITH OTHER PEOPLE: 1

## 2022-12-13 ASSESSMENT — ENCOUNTER SYMPTOMS
COUGH: 0
BLOOD IN STOOL: 0
SHORTNESS OF BREATH: 0

## 2022-12-13 ASSESSMENT — SOCIAL DETERMINANTS OF HEALTH (SDOH): HOW HARD IS IT FOR YOU TO PAY FOR THE VERY BASICS LIKE FOOD, HOUSING, MEDICAL CARE, AND HEATING?: NOT HARD AT ALL

## 2022-12-14 ENCOUNTER — TELEPHONE (OUTPATIENT)
Dept: ORTHOPEDIC SURGERY | Age: 37
End: 2022-12-14

## 2022-12-14 LAB
ESTIMATED AVERAGE GLUCOSE: 168.6 MG/DL
HBA1C MFR BLD: 7.5 %

## 2022-12-14 NOTE — TELEPHONE ENCOUNTER
General Question     Subject: BRACE  Patient an /or Facility Request: PATIENT NEEDS A PRESCRIPTION FOR A RIGHT LEG BRACE  Contact Number:  [phone]

## 2022-12-20 ENCOUNTER — OFFICE VISIT (OUTPATIENT)
Dept: ORTHOPEDIC SURGERY | Age: 37
End: 2022-12-20

## 2022-12-20 VITALS — BODY MASS INDEX: 26.58 KG/M2 | HEIGHT: 63 IN | WEIGHT: 150 LBS

## 2022-12-20 DIAGNOSIS — M25.20: Primary | ICD-10-CM

## 2022-12-20 NOTE — PROGRESS NOTES
Afsaneh Courser  [id number]  December 20, 2022    Chief Complaint   Patient presents with    Follow-up     Right lower extremity       History: The patient is a 61-year-old gentleman who is here for evaluation of his right lower extremity. The patient is from [place] and has an extensive history with regards to his right lower extremity. He does report a history of neurologic issues and severe seizures at the age of 4. He did not have access to medical care at that time and ultimately developed contractures in his right lower extremity. He then ultimately required several surgeries. He did have surgery in 2001 to realign his right lower extremity. He then had a second surgery on his hip. Since that time he has been wearing a knee ankle-foot orthosis. He is a Kis[place] . He denies any recent injury. He denies any pain at this time. Upon review of the chart, there is mention of possible post polio syndrome. The patient's  past medical history, medications, allergies,  family history, social history, and have been reviewed, and dated and are recorded in the chart. Pertinent items are noted in HPI. Review of systems reviewed from Pertinent History Form dated on 12/20 and available in the patient's chart under the Media tab. Vitals:  Ht 5' 3\" (1.6 m)   Wt 150 lb (68 kg)   BMI 26.57 kg/m²     Physical: On examination today, the patient is alert and oriented x3. Examination of the right lower extremity reveals diffuse atrophy. He has atrophy extending up into his buttocks. He has a well-healed anterolateral scar over his right hip. He essentially has a flail right lower extremity. He is unable to extend the right knee without assistance. We passively range the right knee without difficulty. The patient has 3/5 strength for right knee flexion. He has 3/5 strength for right ankle dorsiflexion and plantarflexion. He has no pain with range of motion of his hips.   Sensation does appear to be grossly intact in the right lower extremity. There is no evidence of DVT. Examination of the left lower extremity is unremarkable. X-rays: AP pelvis obtained in the office today was extensively reviewed. The femoral head is well located. The acetabulum has a somewhat horizontal inclination. The bony structures on the right are decreased in size compared to the left. Impression: Chronic right lower extremity weakness likely related to polio. Plan: At this time, the patient was given a new knee ankle-foot orthosis. He was encouraged to continue wearing the brace. We will refer the patient to [facility] neurology to more clearly define the neurologic issues. Orders Placed This Encounter   Procedures    XR PELVIS (1-2 VIEWS)     Rm 23.  AP Pelvis     Standing Status:   Future     Number of Occurrences:   1     Standing Expiration Date:   12/20/2023

## 2023-02-16 ENCOUNTER — OFFICE VISIT (OUTPATIENT)
Dept: SLEEP MEDICINE | Age: 38
End: 2023-02-16
Payer: MEDICAID

## 2023-02-16 VITALS
OXYGEN SATURATION: 100 % | DIASTOLIC BLOOD PRESSURE: 80 MMHG | HEIGHT: 62 IN | SYSTOLIC BLOOD PRESSURE: 130 MMHG | TEMPERATURE: 96.9 F | RESPIRATION RATE: 18 BRPM | BODY MASS INDEX: 27.53 KG/M2 | HEART RATE: 80 BPM | WEIGHT: 149.6 LBS

## 2023-02-16 DIAGNOSIS — R09.81 NASAL CONGESTION: ICD-10-CM

## 2023-02-16 DIAGNOSIS — G47.19 EXCESSIVE DAYTIME SLEEPINESS: ICD-10-CM

## 2023-02-16 DIAGNOSIS — G47.33 OSA (OBSTRUCTIVE SLEEP APNEA): Primary | ICD-10-CM

## 2023-02-16 DIAGNOSIS — J34.3 NASAL TURBINATE HYPERTROPHY: ICD-10-CM

## 2023-02-16 DIAGNOSIS — R06.83 SNORING: ICD-10-CM

## 2023-02-16 DIAGNOSIS — R06.89 GASPING FOR BREATH: ICD-10-CM

## 2023-02-16 DIAGNOSIS — R06.81 WITNESSED EPISODE OF APNEA: ICD-10-CM

## 2023-02-16 PROCEDURE — G8427 DOCREV CUR MEDS BY ELIG CLIN: HCPCS | Performed by: PSYCHIATRY & NEUROLOGY

## 2023-02-16 PROCEDURE — 1036F TOBACCO NON-USER: CPT | Performed by: PSYCHIATRY & NEUROLOGY

## 2023-02-16 PROCEDURE — G8482 FLU IMMUNIZE ORDER/ADMIN: HCPCS | Performed by: PSYCHIATRY & NEUROLOGY

## 2023-02-16 PROCEDURE — G8419 CALC BMI OUT NRM PARAM NOF/U: HCPCS | Performed by: PSYCHIATRY & NEUROLOGY

## 2023-02-16 PROCEDURE — 99204 OFFICE O/P NEW MOD 45 MIN: CPT | Performed by: PSYCHIATRY & NEUROLOGY

## 2023-02-16 RX ORDER — FLUTICASONE PROPIONATE 50 MCG
2 SPRAY, SUSPENSION (ML) NASAL DAILY
Qty: 16 G | Refills: 5 | Status: SHIPPED | OUTPATIENT
Start: 2023-02-16

## 2023-02-16 ASSESSMENT — ENCOUNTER SYMPTOMS
APNEA: 1
GASTROINTESTINAL NEGATIVE: 1
CHOKING: 1
EYES NEGATIVE: 1

## 2023-02-16 ASSESSMENT — SLEEP AND FATIGUE QUESTIONNAIRES
HOW LIKELY ARE YOU TO NOD OFF OR FALL ASLEEP WHILE SITTING INACTIVE IN A PUBLIC PLACE: 0
NECK CIRCUMFERENCE (INCHES): 16
HOW LIKELY ARE YOU TO NOD OFF OR FALL ASLEEP IN A CAR, WHILE STOPPED FOR A FEW MINUTES IN TRAFFIC: 0
HOW LIKELY ARE YOU TO NOD OFF OR FALL ASLEEP WHEN YOU ARE A PASSENGER IN A CAR FOR AN HOUR WITHOUT A BREAK: 2
HOW LIKELY ARE YOU TO NOD OFF OR FALL ASLEEP WHILE WATCHING TV: 1
HOW LIKELY ARE YOU TO NOD OFF OR FALL ASLEEP WHILE LYING DOWN TO REST IN THE AFTERNOON WHEN CIRCUMSTANCES PERMIT: 2
HOW LIKELY ARE YOU TO NOD OFF OR FALL ASLEEP WHILE SITTING AND TALKING TO SOMEONE: 0
HOW LIKELY ARE YOU TO NOD OFF OR FALL ASLEEP WHILE SITTING QUIETLY AFTER LUNCH WITHOUT ALCOHOL: 1
ESS TOTAL SCORE: 7
HOW LIKELY ARE YOU TO NOD OFF OR FALL ASLEEP WHILE SITTING AND READING: 1

## 2023-02-16 NOTE — PATIENT INSTRUCTIONS
Your feedback is important. If you receive a survey asking about your care experience, please respond. Your answers will help ensure you receive high-quality care at this office. Questions or concerns about your visit? Please call our office and speak to our manager.   Thank you!!        Orders Placed This Encounter   Procedures    Ambulatory referral to ENT     Referral Priority:   Routine     Referral Type:   Eval and Treat     Referral Reason:   Specialty Services Required     Number of Visits Requested:   1    Sleep Study with PAP Titration     Standing Status:   Future     Standing Expiration Date:   2/16/2024     Order Specific Question:   Sleep Study Titration Type     Answer:   Split Night Study (Baseline followed by PAP Titration)     Order Specific Question:   Location For Sleep Study     Answer:   [place]     Order Specific Question:   Select Sleep Lab Location     Answer:   [facility]

## 2023-02-16 NOTE — PROGRESS NOTES
MD KO Ferreira Board Certified in Sleep Medicine  Certified [facility] Sleep Medicine  Board Certified in Neurology [address]  [address] [facility] [address], [place] [address] ([address]  [address], [address]           [address]  [facility] SLEEP MEDICINE [address] [address] [phone]    Subjective:     Patient ID: Irving Hernandez is a 41 y.o. male. Chief Complaint   Patient presents with    Snoring    Establish Care         HPI:        Irving Hernandez is a 41 y.o. male referred by Dr. Susy Pedroza for a sleep evaluation. He complains of snoring, snorting, choking, periods of not breathing, tossing and turning, excessive daytime sleepiness, feels sleepy during the day, take naps during the day but he denies knees buckling with laughing, completely or partially paralyzed while falling asleep or waking up, noisy environment, uncomfortable room temperature, uncomfortable bedding. Symptoms began several months ago, gradually worsening since that time. The patient's bed-partner confirmed the snoring and stopped breathing at night. SLEEP SCHEDULE: Goes to bed around 11:30 PM in the weekdays and 11:30 PM in the weekends. It usually takes the patient 10 minutes to fall asleep. The patient gets up 1-2 per night to go to the bathroom. The Patient finally gets up at 6:30 AM during the weekdays and 8:30 AM in the weekends. patient wakes up with dry mouth and sometimes morning headache. . the headache usually dull headache. The patient has restless sleep with frequent arousals in addition to the Patient has significant daytime sleepiness. The Patient scored [place] Sleepiness Score: 7 on [place] Sleepiness Scale ( more than 10 is indicative of daytime sleepiness)and 31 in fatigue scale ( more than 36 is indicative of daytime fatigue).  The patient takes 2 naps/week for 25 minutes and usually is not refreshing nap. Previous evaluation and treatment has included- none.    has gained 10 in the last 5 years,      DOT/CDL - N/A  PETRA/Shonda - N/A      Previous Report(s) Reviewed: historical medical records       Social History     Socioeconomic History    Marital status:      Spouse name: Not on file    Number of children: Not on file    Years of education: Not on file    Highest education level: Not on file   Occupational History    Not on file   Tobacco Use    Smoking status: Former     Packs/day: 0.25     Years: 0.50     Pack years: 0.13     Types: Cigarettes     Quit date: 2013     Years since quitting: 10.1     Passive exposure: Past    Smokeless tobacco: Never   Vaping Use    Vaping Use: Never used   Substance and Sexual Activity    Alcohol use: No    Drug use: No    Sexual activity: Yes     Partners: Female   Other Topics Concern    Not on file   Social History Narrative    Not on file     Social Determinants of Health     Financial Resource Strain: Low Risk     Difficulty of Paying Living Expenses: Not hard at all   Food Insecurity: No Food Insecurity    Worried About Running Out of Food in the Last Year: Never true    Ran Out of Food in the Last Year: Never true   Transportation Needs: Not on file   Physical Activity: Insufficiently Active    Days of Exercise per Week: 2 days    Minutes of Exercise per Session: 10 min   Stress: Not on file   Social Connections: Not on file   Intimate Partner Violence: Not At Risk    Fear of Current or Ex-Partner: No    Emotionally Abused: No    Physically Abused: No    Sexually Abused: No   Housing Stability: Not on file       Prior to Admission medications    Medication Sig Start Date End Date Taking?  Authorizing Provider   fluticasone (FLONASE) 50 MCG/ACT nasal spray 2 sprays by Each Nostril route daily 2/16/23  Yes MD Derek Morris Mai MISC by Does not apply route Expiration 5 Years. 12/13/22  Yes Parish Keene, DO   vitamin D (ERGOCALCIFEROL) 1.25 MG (24493 UT) CAPS capsule Take 1 capsule by mouth once a week 12/13/22  Yes Parish Keene, DO   vitamin B-12 (CYANOCOBALAMIN) 100 MCG tablet Take 1 tablet by mouth daily 12/13/22 12/13/23 Yes Parish Keene, DO       Allergies as of 02/16/2023    (No Known Allergies)       Patient Active Problem List   Diagnosis    Hx of post-polio syndrome    Paralysis of right lower extremity (HCC)       Past Medical History:   Diagnosis Date    Hx of post-polio syndrome     Prediabetes     Seizures ([address]     as a 2year old       Past Surgical History:   Procedure Laterality Date    APPENDECTOMY  2010    LEG SURGERY Right 1998       Family History   Problem Relation Age of Onset    Diabetes Mother         She is dibetic    High Blood Pressure Father     Seizures Father     Kidney Disease Father     Diabetes Paternal Grandmother     Asthma Paternal Grandfather        Review of Systems   Constitutional: Negative. HENT: Negative. Eyes: Negative. Respiratory:  Positive for apnea and choking. Cardiovascular: Negative. Negative for leg swelling. Gastrointestinal: Negative. Endocrine: Negative. Genitourinary:  Positive for frequency. Musculoskeletal: Negative. Skin: Negative. Neurological:  Positive for headaches. Hematological: Negative. Psychiatric/Behavioral:  The patient is nervous/anxious. Objective:     Vitals:  Weight BMI Neck circumference    Wt Readings from Last 3 Encounters:   02/16/23 149 lb 9.6 oz (67.9 kg)   12/20/22 150 lb (68 kg)   12/13/22 152 lb (68.9 kg)    Body mass index is 27.36 kg/m².  Neck circumference (Inches): 16     BP HR SaO2   BP Readings from Last 3 Encounters:   02/16/23 130/80   12/13/22 136/88   09/16/19 136/89    Pulse Readings from Last 3 Encounters:   02/16/23 80   12/13/22 72   09/16/19 79    SpO2 Readings from Last 3 Encounters:   02/16/23 100%   09/16/19 95%   05/22/19 97%        The mandibular molar Class :   [x]1 []2 []3      Mallampati I Airway Classification:   []1 []2 [x]3 []4        Physical Exam  Vitals and nursing note reviewed. Constitutional:       Appearance: Normal appearance. HENT:      Nose: Congestion present. Comments: Turbinate hypertrophy       Mouth/Throat:      Comments: Mallampati class 3, no retrognathia or hypognathia , normal airflow in bilateral nostrils, no septum deviation , crowded oropharynx with low soft palate, high arched hard palate,no tonsils enlargement. Eyes:      Extraocular Movements: Extraocular movements intact. Cardiovascular:      Rate and Rhythm: Normal rate and regular rhythm. Pulmonary:      Effort: Pulmonary effort is normal.      Breath sounds: Normal breath sounds. Musculoskeletal:         General: Deformity (both legs) present. Cervical back: Neck supple. Skin:     General: Skin is warm. Neurological:      Mental Status: Mental status is at baseline. Psychiatric:         Mood and Affect: Mood normal.       Assessment:    Obstructive sleep apnea especially with snoring, snorting,  observed apnea, daytime sleepiness, Mallampati class of 3 and obesity. Diagnosis Orders   1. EVELINA (obstructive sleep apnea)  Sleep Study with PAP Titration      2. Snoring        3. Witnessed episode of apnea        4. Gasping for breath        5. Excessive daytime sleepiness        6. Nasal congestion  Ambulatory referral to ENT    fluticasone (FLONASE) 50 MCG/ACT nasal spray      7. Nasal turbinate hypertrophy  Ambulatory referral to ENT    fluticasone (FLONASE) 50 MCG/ACT nasal spray        Plan:     Patient was counseled about the pathophysiology of obstructive sleep apnea syndrome and the methods for evaluating its presence and severity. Patient was counseled to avoid driving and other potentially hazardous circumstances if the patient is experiencing excessive sleepiness.   Treatment considerations include the use of nasal CPAP, oral dental appliance or a surgical intervention, which should be based on otolarygologic findings, In the meantime, the patient should be cautioned to avoid the use of alcohol or other depressant medications because of potential for increasing the duration and severity of apnea and cautioned regarding driving or operating and dangerous equipment if the patient is experiencing daytime sleepiness. .       Orders Placed This Encounter   Procedures    Ambulatory referral to ENT    Sleep Study with PAP Titration         Return for F/U between 31 and 90 days from the recieving CPAP.     Karl Sprague MD  Medical Director - [facility]

## 2023-02-23 ENCOUNTER — OFFICE VISIT (OUTPATIENT)
Dept: ENT CLINIC | Age: 38
End: 2023-02-23
Payer: MEDICAID

## 2023-02-23 VITALS
HEIGHT: 62 IN | WEIGHT: 149 LBS | BODY MASS INDEX: 27.42 KG/M2 | DIASTOLIC BLOOD PRESSURE: 102 MMHG | HEART RATE: 70 BPM | SYSTOLIC BLOOD PRESSURE: 185 MMHG | OXYGEN SATURATION: 98 %

## 2023-02-23 DIAGNOSIS — R06.83 SNORING: ICD-10-CM

## 2023-02-23 DIAGNOSIS — J34.2 DEVIATED NASAL SEPTUM: ICD-10-CM

## 2023-02-23 DIAGNOSIS — J34.89 NASAL OBSTRUCTION: ICD-10-CM

## 2023-02-23 DIAGNOSIS — R09.81 NASAL CONGESTION: Primary | ICD-10-CM

## 2023-02-23 DIAGNOSIS — J34.3 HYPERTROPHY OF BOTH INFERIOR NASAL TURBINATES: ICD-10-CM

## 2023-02-23 PROCEDURE — G8419 CALC BMI OUT NRM PARAM NOF/U: HCPCS | Performed by: STUDENT IN AN ORGANIZED HEALTH CARE EDUCATION/TRAINING PROGRAM

## 2023-02-23 PROCEDURE — G8482 FLU IMMUNIZE ORDER/ADMIN: HCPCS | Performed by: STUDENT IN AN ORGANIZED HEALTH CARE EDUCATION/TRAINING PROGRAM

## 2023-02-23 PROCEDURE — 1036F TOBACCO NON-USER: CPT | Performed by: STUDENT IN AN ORGANIZED HEALTH CARE EDUCATION/TRAINING PROGRAM

## 2023-02-23 PROCEDURE — G8427 DOCREV CUR MEDS BY ELIG CLIN: HCPCS | Performed by: STUDENT IN AN ORGANIZED HEALTH CARE EDUCATION/TRAINING PROGRAM

## 2023-02-23 PROCEDURE — 99203 OFFICE O/P NEW LOW 30 MIN: CPT | Performed by: STUDENT IN AN ORGANIZED HEALTH CARE EDUCATION/TRAINING PROGRAM

## 2023-02-23 RX ORDER — FLUTICASONE PROPIONATE 50 MCG
2 SPRAY, SUSPENSION (ML) NASAL 2 TIMES DAILY
Qty: 2 EACH | Refills: 5 | Status: SHIPPED | OUTPATIENT
Start: 2023-02-23

## 2023-02-23 NOTE — PROGRESS NOTES
Carlton Chiu [place] (:  1985) is a 41 y.o. male, here for evaluation of the following chief complaint(s):  Sinus Problem      ASSESSMENT/PLAN:  1. Nasal congestion  2. Nasal obstruction  3. Deviated nasal septum  4. Hypertrophy of both inferior nasal turbinates  5. Snoring    This is a very pleasant 41 y.o. male here today for evaluation of the the above-noted complaints. The patient has nasal obstruction, uvular elongation, septal deviation and turbinate hypertrophy. We discussed different treatment options including doing nothing, further medical management or surgical options. He has noticed some improvement of his snoring with Flonase. I have asked him to increase the dose to 2 sprays twice daily. Patient will get his sleep study, and if a candidate for CPAP appliance, we will trial this first.  I will see him back we can discuss whether to consider nasal airway surgery and reduction of his uvula. Medical Decision Making: The following items were considered in medical decision making:  Independent review of images  Review / order clinical lab tests  Review / order radiology tests  Decision to obtain old records  Review and summation [email or web address]    SUBJECTIVE/OBJECTIVE:  LEV Shannon is here today for evaluation of issues related to snoring and possible EVELINA. He was evaluated by my colleague and sleep medicine, Dr. Idris Osman, and referred here for further evaluation and management. The patient states that he snores loudly at night. He will wake up multiple times per night. Sometimes he will have to urinate when he wakes up. The patient states that he has intermittent difficulty breathing through his nose. He gets postnasal drainage. He does not get frequent sinus infections, facial pain and pressure, decreased sense of smell or epistaxis.     He is set to get a sleep study in March. He was recently started on Flonase. His partner states that this is improved his snoring. He has no history of bleeding disorders, surgeries of the head and neck. REVIEW OF SYSTEMS  The following systems were reviewed and revealed the following in addition to any already discussed in the HPI:    PHYSICAL EXAM    GENERAL: No acute distress, alert and oriented, no hoarseness, strong voice  EYES: EOMI, Anti-icteric  HENT:   Head: Normocephalic and atraumatic. Face:  Symmetric, facial nerve intact  Right Ear: Normal external ear, normal external auditory canal, intact tympanic membrane with normal mobility and aerated middle ear  Left Ear: Normal external ear, normal external auditory canal, intact tympanic membrane with normal mobility and aerated middle ear  Mouth/Oral Cavity:  normal lips, Uvula is midline, no mucosal lesions, no trismus, normal dentition, normal salivary quality/flow  Oropharynx/Larynx:  normal oropharynx, 1+ tonsils  Nose:Normal external nasal appearance. Anterior rhinoscopy shows  a deviated septum preventing view posteriorly. Hypertrophy of turbinates. Normal mucosa   NECK: Normal range of motion, no thyromegaly, trachea is midline, no lymphadenopathy, no neck masses, no crepitus  CHEST: Normal respiratory effort, no retractions, breathing comfortably  SKIN: No rashes, normal appearing skin, no evidence of skin lesions/tumors  Neuro:  cranial nerve II-XII intact; normal gait  Cardio:  no edema    Uvular elongation    PROCEDURE  Nasal Endoscopy (CPT code 43176)-no charge       Due to the patients chronic sinus disease and/or history of sinonasal neoplasm for surveillance a nasal endoscopy with or without debridement will be performed to complete a significant physical examination of the patient which cannot be performed by anterior rhinoscopy alone (failure of complete examination of the paranasal sinuses).  Failure to provide this procedure may lead to late detection of significant chronic benign disease, acute exacerbation, resolution or failure of early diagnosis of recurrent cancer. The procedure report is present in the body of the chart. Verbal consent was received. After topical anesthesia and decongestion had been obtained using aerosolized 1% lidocaine and oxymetazoline, a 45 degree rigid endoscope was placed into both nares with the patient in a sitting position. The following was observed:      Septum: intact and deviated to the right  Other:   -The inferior and middle turbinates were examined. The middle meatus, and sphenoethmoid recess was examined bilaterally.    -Inferior turbinate hypertrophy, no adenoid hypertrophy.   -There were no complications. Tolerated well without complication. I attest that I was present for and did the entire procedure myself. This note was generated completely or in part utilizing Dragon dictation speech recognition software. Occasionally, words are mistranscribed and despite editing, the text may contain inaccuracies due to incorrect word recognition. If further clarification is needed please contact the office at [phone]. An electronic signature was used to authenticate this note.     --Eddie Reyes MD

## 2023-03-17 ENCOUNTER — HOSPITAL ENCOUNTER (OUTPATIENT)
Dept: SLEEP CENTER | Age: 38
Discharge: HOME | End: 2023-03-17

## 2023-03-17 DIAGNOSIS — G47.33 OSA (OBSTRUCTIVE SLEEP APNEA): ICD-10-CM

## 2023-03-20 PROBLEM — G47.10 HYPERSOMNIA: Status: ACTIVE | Noted: 2023-03-20

## 2023-03-21 ENCOUNTER — TELEPHONE (OUTPATIENT)
Dept: PULMONOLOGY | Age: 38
End: 2023-03-21

## 2023-03-21 NOTE — TELEPHONE ENCOUNTER
Attempted to contact patient re; Sleep study showed No EVELINA. AHI was 0.9  per hr. And O2 Desaturations to 88%. Dr Annie lynchlow up to discuss results.

## 2023-03-23 NOTE — TELEPHONE ENCOUNTER
Read him his sleep study results, does not require . Asked for appt the 28th to review with dr. Mars Lewis.

## 2023-03-28 ENCOUNTER — OFFICE VISIT (OUTPATIENT)
Dept: SLEEP MEDICINE | Age: 38
End: 2023-03-28
Payer: MEDICAID

## 2023-03-28 VITALS
DIASTOLIC BLOOD PRESSURE: 90 MMHG | SYSTOLIC BLOOD PRESSURE: 150 MMHG | RESPIRATION RATE: 18 BRPM | OXYGEN SATURATION: 99 % | HEIGHT: 62 IN | WEIGHT: 150.8 LBS | HEART RATE: 75 BPM | BODY MASS INDEX: 27.75 KG/M2 | TEMPERATURE: 97.3 F

## 2023-03-28 DIAGNOSIS — R53.83 FATIGUE, UNSPECIFIED TYPE: ICD-10-CM

## 2023-03-28 DIAGNOSIS — F51.12 INSUFFICIENT SLEEP SYNDROME: Primary | ICD-10-CM

## 2023-03-28 DIAGNOSIS — R06.83 SNORING: ICD-10-CM

## 2023-03-28 LAB
CATARACTS: NEGATIVE
DIABETIC RETINOPATHY: NEGATIVE
GLAUCOMA: NEGATIVE
INTRAOCULAR PRESSURE EYE: 11
INTRAOCULAR PRESSURE EYE: 13
VISUAL ACUITY DISTANCE LEFT EYE: NORMAL
VISUAL ACUITY DISTANCE RIGHT EYE: NORMAL

## 2023-03-28 PROCEDURE — G8482 FLU IMMUNIZE ORDER/ADMIN: HCPCS | Performed by: PSYCHIATRY & NEUROLOGY

## 2023-03-28 PROCEDURE — G8427 DOCREV CUR MEDS BY ELIG CLIN: HCPCS | Performed by: PSYCHIATRY & NEUROLOGY

## 2023-03-28 PROCEDURE — 99214 OFFICE O/P EST MOD 30 MIN: CPT | Performed by: PSYCHIATRY & NEUROLOGY

## 2023-03-28 PROCEDURE — G8419 CALC BMI OUT NRM PARAM NOF/U: HCPCS | Performed by: PSYCHIATRY & NEUROLOGY

## 2023-03-28 PROCEDURE — 1036F TOBACCO NON-USER: CPT | Performed by: PSYCHIATRY & NEUROLOGY

## 2023-03-28 NOTE — PROGRESS NOTES
Vitals:  Weight BMI Neck circumference    Wt Readings from Last 3 Encounters:   03/28/23 150 lb 12.8 oz (68.4 kg)   02/23/23 149 lb (67.6 kg)   02/16/23 149 lb 9.6 oz (67.9 kg)    Body mass index is 27.58 kg/m². BP HR SaO2   BP Readings from Last 3 Encounters:   03/28/23 (!) 150/90   02/23/23 (!) 185/102   02/16/23 130/80    Pulse Readings from Last 3 Encounters:   03/28/23 75   02/23/23 70   02/16/23 80    SpO2 Readings from Last 3 Encounters:   03/28/23 99%   02/23/23 98%   02/16/23 100%        Themandibular molar Class :   [x]1 []2 []3      Mallampati I Airway Classification:   []1 []2 [x]3 []4      Physical Exam  Vitals and nursing note reviewed. Constitutional:       Appearance: Normal appearance. Cardiovascular:      Rate and Rhythm: Normal rate and regular rhythm. Pulmonary:      Effort: Pulmonary effort is normal.      Breath sounds: Normal breath sounds. Musculoskeletal:      Right lower leg: No edema. Left lower leg: No edema.       :   I discussed the result with the patient. Diagnosis Orders   1. Insufficient sleep syndrome        2. Snoring        3. Fatigue, unspecified type          Plan: Will increase his sleep duration by an hour to 8 hours a night. Will try  Mandibular advancement device for snoring. Will continue his Vit-D  Will consider PSG then MSLT if he still having EDS. No orders of the defined types were placed in this encounter. Return in about 3 months (around 6/28/2023), or if symptoms worsen or fail to improve.     David Slaughter MD  Medical Director - [facility]

## 2023-06-01 RX ORDER — ERGOCALCIFEROL 1.25 MG/1
CAPSULE ORAL
Qty: 12 CAPSULE | Refills: 1 | Status: SHIPPED | OUTPATIENT
Start: 2023-06-01

## 2023-06-01 NOTE — TELEPHONE ENCOUNTER
Medication:   Requested Prescriptions     Pending Prescriptions Disp Refills    vitamin D (ERGOCALCIFEROL) 1.25 MG (33813 UT) CAPS capsule [Pharmacy Med Name: VITAMIN D2 1.25MG(50,000 UNIT)] 12 capsule 1     Sig: TAKE 1 CAPSULE BY MOUTH ONE TIME PER WEEK        Last Filled:  3/11/23    Patient Phone Number: [phone] (home)     Last appt: 12/13/2022   Next appt: Visit date not found    Return in about 6 weeks (around 1/24/2023) for after sleep evaluation. Last OARRS: No flowsheet data found.

## 2023-10-30 RX ORDER — ERGOCALCIFEROL 1.25 MG/1
CAPSULE ORAL
Qty: 12 CAPSULE | Refills: 1 | Status: SHIPPED | OUTPATIENT
Start: 2023-10-30

## 2023-10-30 NOTE — TELEPHONE ENCOUNTER
Medication:   Requested Prescriptions     Pending Prescriptions Disp Refills    vitamin D (ERGOCALCIFEROL) 1.25 MG (01747 UT) CAPS capsule [Pharmacy Med Name: VITAMIN D2 1.25MG(50,000 UNIT)] 12 capsule 1     Sig: TAKE 1 CAPSULE BY MOUTH ONE TIME PER WEEK        Last Filled:  06/1223    Patient Phone Number: [phone] (home)     Last appt: 12/13/2022   Next appt: Visit date not found    Last OARRS:        No data to display

## 2025-01-01 ENCOUNTER — HOSPITAL ENCOUNTER (EMERGENCY)
Age: 40
Discharge: HOME | End: 2025-01-01
Attending: EMERGENCY MEDICINE
Payer: MEDICAID

## 2025-01-01 VITALS
WEIGHT: 144.84 LBS | OXYGEN SATURATION: 99 % | SYSTOLIC BLOOD PRESSURE: 166 MMHG | HEIGHT: 62 IN | TEMPERATURE: 97.8 F | DIASTOLIC BLOOD PRESSURE: 114 MMHG | RESPIRATION RATE: 16 BRPM | HEART RATE: 81 BPM | BODY MASS INDEX: 26.65 KG/M2

## 2025-01-01 DIAGNOSIS — H66.91 RIGHT OTITIS MEDIA, UNSPECIFIED OTITIS MEDIA TYPE: Primary | ICD-10-CM

## 2025-01-01 PROCEDURE — 6370000000 HC RX 637 (ALT 250 FOR IP): Performed by: EMERGENCY MEDICINE

## 2025-01-01 PROCEDURE — 99283 EMERGENCY DEPT VISIT LOW MDM: CPT

## 2025-01-01 RX ORDER — IBUPROFEN 600 MG/1
600 TABLET, FILM COATED ORAL 3 TIMES DAILY PRN
Qty: 30 TABLET | Refills: 0 | Status: SHIPPED | OUTPATIENT
Start: 2025-01-01

## 2025-01-01 RX ORDER — AMOXICILLIN 250 MG/1
500 CAPSULE ORAL ONCE
Status: COMPLETED | OUTPATIENT
Start: 2025-01-01 | End: 2025-01-01

## 2025-01-01 RX ORDER — AMOXICILLIN 500 MG/1
500 CAPSULE ORAL EVERY 8 HOURS
Qty: 30 CAPSULE | Refills: 0 | Status: SHIPPED | OUTPATIENT
Start: 2025-01-01 | End: 2025-01-11

## 2025-01-01 RX ADMIN — AMOXICILLIN 500 MG: 250 CAPSULE ORAL at 12:50

## 2025-01-01 RX ADMIN — IBUPROFEN 600 MG: 200 TABLET, FILM COATED ORAL at 12:49

## 2025-01-01 ASSESSMENT — ENCOUNTER SYMPTOMS
CONSTIPATION: 0
DIARRHEA: 0
COUGH: 0
ABDOMINAL PAIN: 0
EYE REDNESS: 0
EYE PAIN: 0
VOMITING: 0
RHINORRHEA: 0
BACK PAIN: 0
NAUSEA: 0
SHORTNESS OF BREATH: 0

## 2025-01-01 ASSESSMENT — PAIN DESCRIPTION - LOCATION: LOCATION: EAR

## 2025-01-01 ASSESSMENT — PAIN DESCRIPTION - DESCRIPTORS: DESCRIPTORS: PATIENT UNABLE TO DESCRIBE

## 2025-01-01 ASSESSMENT — PAIN SCALES - GENERAL: PAINLEVEL_OUTOF10: 1

## 2025-01-01 ASSESSMENT — PAIN DESCRIPTION - ORIENTATION: ORIENTATION: RIGHT

## 2025-01-01 NOTE — ED PROVIDER NOTES
[facility] EMERGENCY DEPARTMENT  EMERGENCY DEPARTMENT ENCOUNTER        Pt Name: Doug Maza  MRN: [id number]  Birthdate 1985  Date of evaluation: 1/1/2025  Provider: Kanchan Vaughn DO  PCP: Marcial Bean DO  Note Started: 12:19 PM EST 1/1/25    CHIEF COMPLAINT      Ear Pain    HISTORY OF PRESENT ILLNESS: 1 or more Elements     Chief Complaint   Patient presents with    Ear Pain     Arrives with rt side ear pain at night, x1 day yesterday, with ringing in rt ear.     Tinnitus     History from : Patient  Limitations to history : None    Doug Maza is a 39 y.o. male who presents to the emergency department secondary to concern for right ear pain.  Reports has been going on for about a day.  States that he is getting a lot of back on ice to the area and has pain since yesterday especially when he lies down at night for sleep.  Denies any foreign bodies in the ear.  Denies any recent swimming or submersion of ear and water/fluid.    Past medical history noted below. Aside from what is stated above denies any other symptoms or modifying factors.   reports that he quit smoking about 12 years ago. His smoking use included cigarettes. He started smoking about 12 years ago. He has a 0.1 pack-year smoking history. He has been exposed to tobacco smoke. He has never used smokeless tobacco. He reports that he does not drink alcohol and does not use drugs.  Nursing Notes were all reviewed and agreed with or any disagreements addressed in HPI/MDM.  REVIEW OF SYSTEMS :    Review of Systems   Constitutional:  Negative for chills and fever.   HENT:  Positive for ear pain. Negative for congestion and rhinorrhea.    Eyes:  Negative for pain and redness.   Respiratory:  Negative for cough and shortness of breath.    Cardiovascular:  Negative for chest pain and leg swelling.   Gastrointestinal:  Negative for abdominal pain, constipation, diarrhea, nausea and vomiting.   Genitourinary:  Negative for frequency and

## 2025-01-03 ENCOUNTER — OFFICE VISIT (OUTPATIENT)
Dept: ENT CLINIC | Age: 40
End: 2025-01-03

## 2025-01-03 ENCOUNTER — PROCEDURE VISIT (OUTPATIENT)
Dept: AUDIOLOGY | Age: 40
End: 2025-01-03

## 2025-01-03 VITALS
SYSTOLIC BLOOD PRESSURE: 179 MMHG | WEIGHT: 143 LBS | TEMPERATURE: 97.5 F | RESPIRATION RATE: 16 BRPM | HEIGHT: 62 IN | DIASTOLIC BLOOD PRESSURE: 102 MMHG | HEART RATE: 70 BPM | BODY MASS INDEX: 26.31 KG/M2

## 2025-01-03 DIAGNOSIS — H90.A31 MIXED CONDUCTIVE AND SENSORINEURAL HEARING LOSS OF RIGHT EAR WITH RESTRICTED HEARING OF LEFT EAR: Primary | ICD-10-CM

## 2025-01-03 DIAGNOSIS — H90.A22 SENSORINEURAL HEARING LOSS (SNHL) OF LEFT EAR WITH RESTRICTED HEARING OF RIGHT EAR: ICD-10-CM

## 2025-01-03 DIAGNOSIS — H66.001 NON-RECURRENT ACUTE SUPPURATIVE OTITIS MEDIA OF RIGHT EAR WITHOUT SPONTANEOUS RUPTURE OF TYMPANIC MEMBRANE: ICD-10-CM

## 2025-01-03 DIAGNOSIS — H93.11 TINNITUS OF RIGHT EAR: ICD-10-CM

## 2025-01-03 DIAGNOSIS — H91.8X3 ASYMMETRICAL HEARING LOSS: ICD-10-CM

## 2025-01-03 DIAGNOSIS — H69.93 DYSFUNCTION OF BOTH EUSTACHIAN TUBES: ICD-10-CM

## 2025-01-03 RX ORDER — FLUTICASONE PROPIONATE 50 MCG
2 SPRAY, SUSPENSION (ML) NASAL 2 TIMES DAILY
Qty: 1 EACH | Refills: 0 | Status: SHIPPED | OUTPATIENT
Start: 2025-01-03 | End: 2025-02-02

## 2025-01-03 RX ORDER — PREDNISONE 10 MG/1
TABLET ORAL
Qty: 34 TABLET | Refills: 0 | Status: SHIPPED | OUTPATIENT
Start: 2025-01-03

## 2025-01-03 ASSESSMENT — ENCOUNTER SYMPTOMS
BLOOD IN STOOL: 0
SINUS PRESSURE: 0
VOMITING: 0
WHEEZING: 0
STRIDOR: 0
SORE THROAT: 0
DIARRHEA: 0
NAUSEA: 0
COLOR CHANGE: 0
EYE DISCHARGE: 0
SINUS PAIN: 0
BACK PAIN: 0
CHOKING: 0
CONSTIPATION: 0
TROUBLE SWALLOWING: 0
SHORTNESS OF BREATH: 0
FACIAL SWELLING: 0
EYE ITCHING: 0
PHOTOPHOBIA: 0
RHINORRHEA: 0
VOICE CHANGE: 0
COUGH: 0

## 2025-01-03 NOTE — PROGRESS NOTES
to mild sensorineural hearing loss, rising to normal hearing  Speech Recognition Threshold: 15 dB HL  Word Recognition: Excellent 96%, based on NU-6 25-word list at 55 dBHL using recorded speech stimuli.    Tympanometry: Negative peak pressure with normal compliance, Type C tympanogram, consistent with ETD/history of otitis media.       Reliability: Good   Transducer: HF Headphones    See scanned audiogram dated 1/3/2025 for results.        PATIENT EDUCATION:       The following items were discussed with the patient:   - Good Communication Strategies  - Hearing Loss and Hearing Aids  - Tinnitus Management Strategies      Educational information was shared in the After Visit Summary.                                                RECOMMENDATIONS:                                                                                                                                                                                                                                                            The following items are recommended based on patient report and results from today's appointment:   - Continue medical follow-up with Deric Marie DO.   - Retest hearing as medically indicated and/or sooner if a change in hearing is noted.  - Utilize \"Good Communication Strategies\" as discussed to assist in speech understanding with communication partners.    Vivek Palacios  Audiologist    Chart CC'd to: Deric Marie DO      Degree of   Hearing Sensitivity dB Range   Within Normal Limits (WNL) 0 - 20   Mild 20 - 40   Moderate 40 - 55   Moderately-Severe 55 - 70   Severe 70 - 90   Profound 90 +

## 2025-01-03 NOTE — PATIENT INSTRUCTIONS
Raise your voice slightly (do not scream)   - Speak slowly and distinctly   - Use short, simple sentences   - Rephrase your words if the person is having a hard time understanding you    - To avoid distortion, don’t speak directly into a person’s ear      Some additional items that may be helpful:   - Remain patient - this is important for both parties   - Write down items that still cannot be heard/understood.  You may write with pen/paper or consider typing/texting on a cell phone or smart device.   - If background noise is unavoidable, try to keep yourself in a good position in the room.  By sitting at a peña on the side of the restaurant (preferably a corner), it will be easier to communicate than if you were sitting at a table in the middle with background noise surrounding you.  Keep yourself positioned away from music speakers or heavy foot traffic.

## 2025-01-03 NOTE — PROGRESS NOTES
[facility]  [address] , [address]  [place] [address]  P: [phone]  F: [phone]       Patient     Doug Maza  1985    ChiefComplaint     Chief Complaint   Patient presents with    Ear Problem     Right ear hearing not so good feels clogged static sound on medication on for 3 days now        History of Present Illness     Doug Maza is a pleasant 39 y.o. male presents for hearing loss in the right ear.  Patient suffered a recent URI.  Then on New Year's Francy he felt some right-sided ear pain.  He woke up today after with muffled hearing in the right ear and tinnitus in the right ear.  He has some pressure in the left ear as well.  No prior history of ear infections or ear surgeries.  No family history of ear problems.  No room spinning dizziness.  No otorrhea.  He did go to the emergency department and received a course of amoxicillin for otitis media of the right ear.    Audiogram performed today reveals normal to mild sensorineural hearing loss of the left ear with a dip around 4000 Hz and mild to severe downward sloping mixed loss in the right ear.  Flat tympanogram on the right, negative tympanogram on the left.  92% word recognition score in the right, 96% in the left.    Past Medical History     Past Medical History:   Diagnosis Date    Hx of post-polio syndrome     Prediabetes     Seizures (HCC)     as a 4 year old    Tinnitus        Past Surgical History     Past Surgical History:   Procedure Laterality Date    APPENDECTOMY  2010    LEG SURGERY Right 1998       Family History     Family History   Problem Relation Age of Onset    Diabetes Mother         She is dibetic    High Blood Pressure Father     Seizures Father     Kidney Disease Father     Diabetes Paternal Grandmother     Asthma Paternal Grandfather        Social History     Social History     Socioeconomic History    Marital status:      Spouse name: Not on file    Number of children: Not on file

## 2025-01-30 DIAGNOSIS — H66.001 NON-RECURRENT ACUTE SUPPURATIVE OTITIS MEDIA OF RIGHT EAR WITHOUT SPONTANEOUS RUPTURE OF TYMPANIC MEMBRANE: ICD-10-CM

## 2025-01-30 DIAGNOSIS — H69.93 DYSFUNCTION OF BOTH EUSTACHIAN TUBES: ICD-10-CM

## 2025-02-03 RX ORDER — FLUTICASONE PROPIONATE 50 MCG
2 SPRAY, SUSPENSION (ML) NASAL 2 TIMES DAILY
Qty: 1 EACH | Refills: 3 | Status: SHIPPED | OUTPATIENT
Start: 2025-02-03 | End: 2025-03-05